# Patient Record
Sex: MALE | Race: WHITE | NOT HISPANIC OR LATINO | URBAN - METROPOLITAN AREA
[De-identification: names, ages, dates, MRNs, and addresses within clinical notes are randomized per-mention and may not be internally consistent; named-entity substitution may affect disease eponyms.]

---

## 2019-06-07 ENCOUNTER — OFFICE VISIT (OUTPATIENT)
Dept: RETAIL CLINIC | Facility: CLINIC | Age: 29
End: 2019-06-07

## 2019-06-07 VITALS
SYSTOLIC BLOOD PRESSURE: 126 MMHG | HEART RATE: 101 BPM | OXYGEN SATURATION: 96 % | HEIGHT: 70 IN | WEIGHT: 247.4 LBS | TEMPERATURE: 99.2 F | RESPIRATION RATE: 20 BRPM | DIASTOLIC BLOOD PRESSURE: 70 MMHG | BODY MASS INDEX: 35.42 KG/M2

## 2019-06-07 DIAGNOSIS — Z20.818 EXPOSURE TO STREP THROAT: ICD-10-CM

## 2019-06-07 DIAGNOSIS — J02.0 STREP THROAT: Primary | ICD-10-CM

## 2019-06-07 LAB
EXPIRATION DATE: ABNORMAL
INTERNAL CONTROL: ABNORMAL
Lab: ABNORMAL
S PYO AG THROAT QL: POSITIVE

## 2019-06-07 PROCEDURE — 99203 OFFICE O/P NEW LOW 30 MIN: CPT | Performed by: NURSE PRACTITIONER

## 2019-06-07 PROCEDURE — 87880 STREP A ASSAY W/OPTIC: CPT | Performed by: NURSE PRACTITIONER

## 2019-06-07 RX ORDER — AZITHROMYCIN 250 MG/1
TABLET, FILM COATED ORAL
Qty: 6 TABLET | Refills: 0 | Status: SHIPPED | OUTPATIENT
Start: 2019-06-07 | End: 2022-09-01

## 2019-06-07 NOTE — PROGRESS NOTES
CHANA An KAMALJIT Espino is a 29 y.o. male.   Chief Complaint   Patient presents with   • URI      Mr Espino has 3 children, 2 of whom have had strep in the past week.      Sore Throat    This is a new problem. The current episode started yesterday. The problem has been gradually worsening. Neither side of throat is experiencing more pain than the other. The maximum temperature recorded prior to his arrival was 101 - 101.9 F. Associated symptoms include congestion and headaches. Pertinent negatives include no abdominal pain, coughing, diarrhea, drooling, ear discharge, ear pain, hoarse voice, plugged ear sensation, neck pain, shortness of breath, stridor, swollen glands, trouble swallowing or vomiting. He has had exposure to strep. He has tried NSAIDs for the symptoms. The treatment provided moderate relief.        The following portions of the patient's history were reviewed and updated as appropriate: allergies, current medications, past family history, past medical history, past social history, past surgical history and problem list.    Current Outpatient Medications:   •  azithromycin (ZITHROMAX Z-FAIZA) 250 MG tablet, Take 2 tablets the first day, then 1 tablet daily for 4 days., Disp: 6 tablet, Rfl: 0    Allergies   Allergen Reactions   • Amoxicillin Anaphylaxis   • Cephalosporins Anaphylaxis   • Penicillins Anaphylaxis       Review of Systems   Constitutional: Positive for chills, fatigue and fever. Negative for appetite change.   HENT: Positive for congestion and sore throat. Negative for drooling, ear discharge, ear pain, hoarse voice, postnasal drip, rhinorrhea, sinus pressure, sneezing, tinnitus and trouble swallowing.    Eyes: Negative for redness and itching.   Respiratory: Negative for cough, shortness of breath, wheezing and stridor.    Gastrointestinal: Negative for abdominal pain, diarrhea, nausea and vomiting.   Musculoskeletal: Negative for myalgias and neck pain.   Skin: Negative for rash.  "  Neurological: Positive for headaches. Negative for dizziness.       Objective     Visit Vitals  /70   Pulse 101   Temp 99.2 °F (37.3 °C) (Oral)   Resp 20   Ht 177.8 cm (70\")   Wt 112 kg (247 lb 6.4 oz)   SpO2 96%   BMI 35.50 kg/m²         Physical Exam   Constitutional: He is oriented to person, place, and time. He appears well-developed and well-nourished. He does not appear ill. No distress.   HENT:   Head: Normocephalic.   Right Ear: Tympanic membrane, external ear and ear canal normal.   Left Ear: Tympanic membrane, external ear and ear canal normal.   Nose: Mucosal edema present. No sinus tenderness.   Mouth/Throat: Uvula is midline. Mucous membranes are pale. Posterior oropharyngeal erythema present. Tonsils are 3+ on the right. Tonsils are 2+ on the left. Tonsillar exudate.   Eyes: Conjunctivae are normal.   Cardiovascular: Regular rhythm and normal heart sounds. Tachycardia present.   Pulmonary/Chest: Effort normal and breath sounds normal.   Lymphadenopathy:     He has cervical adenopathy.   Neurological: He is alert and oriented to person, place, and time.       Lab Results (last 24 hours)     Procedure Component Value Units Date/Time    POC Rapid Strep A [[id number]]  (Abnormal) Collected:  06/07/19 1800    Specimen:  Swab Updated:  06/07/19 1800     Rapid Strep A Screen Positive     Internal Control Passed     Lot Number [id number]     Expiration Date 10/31/20          Assessment/Plan   Juve was seen today for uri.    Diagnoses and all orders for this visit:    Strep throat  -     azithromycin (ZITHROMAX Z-FAIZA) 250 MG tablet; Take 2 tablets the first day, then 1 tablet daily for 4 days.  - Drink plenty of clear, decaffeinated fluids, as tolerated.  - Acetaminophen or ibuprofen, per package directions, as needed for sore throat, fever > 100, headache  - Discard toothbrush after 24 hours on antibiotic.  Do not share eating/drinking utensils, wash in hot water.  - Warm salt water gargles, or OTC " lozenges/sprays per package directions, as needed for sore throat.    Exposure to strep throat  -     POC Rapid Strep A    An After Visit Summary was reviewed, printed and given to the patient.  Understanding verbalized and patient agreed with treatment plan.  If symptom(s) worsen or fail to improve, return to clinic, follow up with PCP or go to [facility].

## 2023-07-10 PROBLEM — M25.572 SINUS TARSI SYNDROME OF LEFT ANKLE: Status: ACTIVE | Noted: 2023-07-10

## 2023-08-21 ENCOUNTER — OFFICE VISIT (OUTPATIENT)
Dept: ORTHOPEDIC SURGERY | Facility: CLINIC | Age: 33
End: 2023-08-21

## 2023-08-21 VITALS
HEIGHT: 71 IN | BODY MASS INDEX: 33.04 KG/M2 | DIASTOLIC BLOOD PRESSURE: 84 MMHG | WEIGHT: 236 LBS | SYSTOLIC BLOOD PRESSURE: 132 MMHG

## 2023-08-21 DIAGNOSIS — M25.572 ACUTE LEFT ANKLE PAIN: Primary | ICD-10-CM

## 2023-08-21 PROBLEM — E66.9 OBESITY (BMI 30.0-34.9): Status: ACTIVE | Noted: 2023-08-21

## 2023-08-21 NOTE — PROGRESS NOTES
"                          [facility]     Office Follow Up Visit     Date: 08/21/2023   Patient Name: Juve Esipno  MRN: [id number]  YOB: 1990  Chief Complaint:   Chief Complaint   Patient presents with    Follow-up     6 week follow up --  sinus Tarsi syndrome(left)     History of Present Illness:   Juve Espino is a 33 y.o. male who is here today for follow up for left lateral ankle pain.  Has been weightbearing as tolerated in tall cam walking boot since last visit.  States pain has improved.  Did not improve fairly immediately after last visit following steroid injection.  Continue to improve with ambulating in cam walking boot.  States still a little bit painful but patient is very happy how much better it feels today.  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  Did recently get some new supportive cowboy boots and is optimistic that wearing them and working in them will help alleviate his symptoms.    Subjective   I reviewed the patient's chief complaint, history of present illness, review of systems, past medical history, surgical history, family history, social history, medications and allergy list   Objective    Vital Signs:   Vitals:    08/21/23 1115   BP: 132/84   Weight: 107 kg (236 lb)   Height: 179.3 cm (70.59\")     Body mass index is 33.3 kg/mý.    Ortho Exam:  left LE Foot and Ankle Exam:   Hindfoot alignment is neutral. Plantigrade foot.   Neurological exam of the superficial peroneal, deep peroneal, plantar, sural and saphenous nerves demonstrates intact sensation and normal motor function.   There is good perfusion to the toes.   The skin is intact throughout the foot and ankle without ulceration.   Range of motion of ankle, subtalar joint, midfoot and toes is within normal limits.   There is tenderness to palpation at the sinus Tarsi, however improved since last visit    Results Review:  No new imaging    Assessment / Plan  "   Assessment/Plan:   Diagnoses and all orders for this visit:    1. Acute left ankle pain (Primary)      Patient returns to clinic with pain about the sinus Tarsi.   to that area and having some pain with ambulation however much improved since last visit.  Patient with slight cavovarus deformity which seems like an unusual pattern to develop his symptoms however they have improved with changing his footwear, resting in a boot, and having an injection.  Patient has also discontinued taking ibuprofen because the pain has improved from so much which I think portends a good prognosis.  We made an appointment today for him to follow-up in 6 weeks for reevaluation.  He will plan to cancel it if he is doing fine.  Discussed if his pain returns he should return to the boot and come back and see me in 6 weeks.  At 6 weeks we would consider doing a repeat injection versus custom orthotics versus MRI.  I am glad he is doing well was a pleasure seeing him today.    Follow Up:   Return in about 6 weeks (around 10/2/2023), or if symptoms worsen or fail to improve.      Gerry Cali MD  [facility] [place] [facility]